# Patient Record
Sex: MALE | Race: WHITE | Employment: FULL TIME | URBAN - METROPOLITAN AREA
[De-identification: names, ages, dates, MRNs, and addresses within clinical notes are randomized per-mention and may not be internally consistent; named-entity substitution may affect disease eponyms.]

---
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
## 2022-04-11 ENCOUNTER — HISTORICAL (OUTPATIENT)
Dept: ADMINISTRATIVE | Facility: HOSPITAL | Age: 22
End: 2022-04-11

## 2022-04-25 VITALS
WEIGHT: 171.75 LBS | SYSTOLIC BLOOD PRESSURE: 129 MMHG | BODY MASS INDEX: 23.26 KG/M2 | OXYGEN SATURATION: 100 % | HEIGHT: 72 IN | DIASTOLIC BLOOD PRESSURE: 73 MMHG

## 2023-07-07 ENCOUNTER — OFFICE VISIT (OUTPATIENT)
Dept: URGENT CARE | Facility: CLINIC | Age: 23
End: 2023-07-07
Payer: COMMERCIAL

## 2023-07-07 VITALS
RESPIRATION RATE: 20 BRPM | TEMPERATURE: 99 F | HEART RATE: 77 BPM | HEIGHT: 70 IN | OXYGEN SATURATION: 97 % | DIASTOLIC BLOOD PRESSURE: 77 MMHG | SYSTOLIC BLOOD PRESSURE: 124 MMHG

## 2023-07-07 DIAGNOSIS — H61.23 BILATERAL IMPACTED CERUMEN: Primary | ICD-10-CM

## 2023-07-07 PROCEDURE — 99203 PR OFFICE/OUTPT VISIT, NEW, LEVL III, 30-44 MIN: ICD-10-PCS | Mod: 25,,, | Performed by: PHYSICIAN ASSISTANT

## 2023-07-07 PROCEDURE — 69209 REMOVE IMPACTED EAR WAX UNI: CPT | Mod: 50,,, | Performed by: PHYSICIAN ASSISTANT

## 2023-07-07 PROCEDURE — 69209 EAR CERUMEN REMOVAL: ICD-10-PCS | Mod: 50,,, | Performed by: PHYSICIAN ASSISTANT

## 2023-07-07 PROCEDURE — 99203 OFFICE O/P NEW LOW 30 MIN: CPT | Mod: 25,,, | Performed by: PHYSICIAN ASSISTANT

## 2023-07-07 NOTE — PROCEDURES
Ear Cerumen Removal    Date/Time: 7/7/2023 3:10 PM  Performed by: Omega Arora PA-C  Authorized by: Omega Arora PA-C     Consent Done?:  Yes (Written)  Medication Used:  Debrox  Location details:  Both ears  Procedure type: irrigation    Cerumen  Removal Results:  Cerumen completely removed  Patient tolerance:  Patient tolerated the procedure well with no immediate complications

## 2023-07-07 NOTE — PROGRESS NOTES
"Subjective:      Patient ID: Ramon Arroyo is a 23 y.o. male.    Vitals:  height is 5' 10" (1.778 m). His temperature is 98.8 °F (37.1 °C). His blood pressure is 124/77 and his pulse is 77. His respiration is 20 and oxygen saturation is 97%.     Chief Complaint: Ear Fullness (Ears stopped up after jumping off a rope swing.)    Jumped off a rope swing Tuesday, now feels like his ears are stopped up and c/o decreased hearing. He attempted debrox but is without relief of sx. He denies any uri sx.   ROS   Objective:     Physical Exam   HENT:   Head: Normocephalic and atraumatic.   Ears:   Right Ear: Tympanic membrane and external ear normal. impacted cerumen  Left Ear: Tympanic membrane and external ear normal. impacted cerumen  Abdominal: Normal appearance.   Neurological: He is alert.   See procedure note.      Assessment:     1. Bilateral impacted cerumen        Plan:       Bilateral impacted cerumen  -     Ear Cerumen Removal        Follow up if needed.             "